# Patient Record
Sex: MALE | Race: BLACK OR AFRICAN AMERICAN
[De-identification: names, ages, dates, MRNs, and addresses within clinical notes are randomized per-mention and may not be internally consistent; named-entity substitution may affect disease eponyms.]

---

## 2019-10-02 ENCOUNTER — HOSPITAL ENCOUNTER (EMERGENCY)
Dept: HOSPITAL 74 - FER | Age: 44
Discharge: HOME | End: 2019-10-02
Payer: COMMERCIAL

## 2019-10-02 VITALS — TEMPERATURE: 98.2 F | SYSTOLIC BLOOD PRESSURE: 130 MMHG | HEART RATE: 77 BPM | DIASTOLIC BLOOD PRESSURE: 75 MMHG

## 2019-10-02 VITALS — BODY MASS INDEX: 28.8 KG/M2

## 2019-10-02 DIAGNOSIS — S86.012A: Primary | ICD-10-CM
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
## 2019-10-02 DIAGNOSIS — F17.210: ICD-10-CM
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
## 2019-10-02 NOTE — PDOC
History of Present Illness





- General


Chief Complaint: Pain, Acute


Stated Complaint: LEFT ANKLE PAIN


Time Seen by Provider: 10/02/19 12:56


History Source: Patient (Patient walked in after injuring left ankle 3 weeks ago

, on top of a similar previous injury)





Past History





- Past Medical History


Allergies/Adverse Reactions: 


 Allergies











Allergy/AdvReac Type Severity Reaction Status Date / Time


 


No Known Allergies Allergy   Verified 10/02/19 12:51











Home Medications: 


Ambulatory Orders





NK [No Known Home Medication]  10/02/19 








CVA: No


COPD: No


Thyroid Disease: No


Other medical history: DENIES





- Psycho Social/Smoking Cessation Hx


Smoking History: Current some day smoker


Number of Cigarettes Smoked Daily: 1


Information on smoking cessation initiated: Yes


Hx Alcohol Use: Yes (OCASIONAL)


Drug/Substance Use Hx: No





*Physical Exam





- Vital Signs


 Last Vital Signs











Temp Pulse Resp BP Pulse Ox


 


 98.2 F   77   17   130/75   100 


 


 10/02/19 12:51  10/02/19 12:51  10/02/19 12:51  10/02/19 12:51  10/02/19 12:51














ED Treatment Course





- RADIOLOGY


Radiology Studies Ordered: 














 Category Date Time Status


 


 ANKLE-LEFT [RAD] Stat Radiology  10/02/19 13:28 Completed














Discharge





- Discharge Information


Problems reviewed: Yes


Clinical Impression/Diagnosis: 


Achilles rupture, left


Qualifiers:


 Encounter type: initial encounter Qualified Code(s): S86.012A - Strain of left 

Achilles tendon, initial encounter





Condition: Stable


Disposition: HOME





- Admission


No





- Additional Discharge Information


Health Concerns: 


Achile's tendon injury


Prescription Drug Monitoring Program (I-STOP) results: I-STOP reviewed and no 

issues identified





- Follow up/Referral


Referrals: 


Yusuf Perry MD [Staff Physician] - 





- Patient Discharge Instructions


Patient Printed Discharge Instructions:  Achilles Tendon Rupture


Additional Instructions: 


[facility], request Orthopedic Evaluation for potential 

surgical repair





- Post Discharge Activity

## 2021-02-18 ENCOUNTER — HOSPITAL ENCOUNTER (EMERGENCY)
Dept: HOSPITAL 74 - FER | Age: 46
Discharge: HOME | End: 2021-02-18
Payer: COMMERCIAL

## 2021-02-18 VITALS — BODY MASS INDEX: 28.6 KG/M2

## 2021-02-18 VITALS — SYSTOLIC BLOOD PRESSURE: 140 MMHG | HEART RATE: 80 BPM | DIASTOLIC BLOOD PRESSURE: 95 MMHG | TEMPERATURE: 98 F

## 2021-02-18 DIAGNOSIS — R07.9: Primary | ICD-10-CM

## 2021-02-18 LAB
ALBUMIN SERPL-MCNC: 4.5 G/DL (ref 3.4–5)
ALP SERPL-CCNC: 92 U/L (ref 45–117)
ALT SERPL-CCNC: 35 U/L (ref 13–61)
ANION GAP SERPL CALC-SCNC: 7 MMOL/L (ref 8–16)
AST SERPL-CCNC: 26 U/L (ref 15–37)
BASOPHILS # BLD: 0.8 % (ref 0–2)
BILIRUB SERPL-MCNC: 0.8 MG/DL (ref 0.2–1)
BUN SERPL-MCNC: 8 MG/DL (ref 7–18)
CALCIUM SERPL-MCNC: 9.2 MG/DL (ref 8.5–10)
CHLORIDE SERPL-SCNC: 106 MMOL/L (ref 98–107)
CO2 SERPL-SCNC: 25 MMOL/L (ref 21–32)
CREAT SERPL-MCNC: 1 MG/DL (ref 0.55–1.3)
DEPRECATED RDW RBC AUTO: 12.9 % (ref 11.9–15.9)
EOSINOPHIL # BLD: 0.8 % (ref 0–4.5)
GLUCOSE SERPL-MCNC: 75 MG/DL (ref 74–106)
HCT VFR BLD CALC: 43.7 % (ref 35.4–49)
HGB BLD-MCNC: 14.2 GM/DL (ref 11.7–16.9)
LYMPHOCYTES # BLD: 19.1 % (ref 8–40)
MCH RBC QN AUTO: 27.5 PG (ref 25.7–33.7)
MCHC RBC AUTO-ENTMCNC: 32.5 G/DL (ref 32–35.9)
MCV RBC: 84.6 FL (ref 80–96)
MONOCYTES # BLD AUTO: 10 % (ref 3.8–10.2)
NEUTROPHILS # BLD: 69.3 % (ref 42.8–82.8)
PLATELET # BLD AUTO: 174 K/MM3 (ref 134–434)
PMV BLD: 8.4 FL (ref 7.5–11.1)
POTASSIUM SERPLBLD-SCNC: 3.8 MMOL/L (ref 3.5–5.1)
PROT SERPL-MCNC: 7.6 G/DL (ref 6.4–8.2)
RBC # BLD AUTO: 5.17 M/MM3 (ref 4–5.6)
SODIUM SERPL-SCNC: 138 MMOL/L (ref 136–145)
WBC # BLD AUTO: 4.9 K/MM3 (ref 4–10.8)